# Patient Record
Sex: MALE | Race: BLACK OR AFRICAN AMERICAN | NOT HISPANIC OR LATINO | Employment: FULL TIME | URBAN - METROPOLITAN AREA
[De-identification: names, ages, dates, MRNs, and addresses within clinical notes are randomized per-mention and may not be internally consistent; named-entity substitution may affect disease eponyms.]

---

## 2017-12-23 ENCOUNTER — HOSPITAL ENCOUNTER (EMERGENCY)
Facility: HOSPITAL | Age: 37
Discharge: HOME | End: 2017-12-23
Attending: EMERGENCY MEDICINE
Payer: COMMERCIAL

## 2017-12-23 VITALS
RESPIRATION RATE: 18 BRPM | TEMPERATURE: 98 F | DIASTOLIC BLOOD PRESSURE: 73 MMHG | OXYGEN SATURATION: 96 % | SYSTOLIC BLOOD PRESSURE: 122 MMHG | HEIGHT: 68 IN | BODY MASS INDEX: 25.01 KG/M2 | HEART RATE: 93 BPM | WEIGHT: 165 LBS

## 2017-12-23 DIAGNOSIS — R05.9 COUGH: Primary | ICD-10-CM

## 2017-12-23 DIAGNOSIS — R09.81 NASAL CONGESTION: ICD-10-CM

## 2017-12-23 PROCEDURE — 94640 AIRWAY INHALATION TREATMENT: CPT

## 2017-12-23 PROCEDURE — 25000242 PHARM REV CODE 250 ALT 637 W/ HCPCS: Performed by: PHYSICIAN ASSISTANT

## 2017-12-23 PROCEDURE — 99284 EMERGENCY DEPT VISIT MOD MDM: CPT | Mod: 25

## 2017-12-23 PROCEDURE — 94761 N-INVAS EAR/PLS OXIMETRY MLT: CPT

## 2017-12-23 RX ORDER — ALBUTEROL SULFATE 90 UG/1
1-2 AEROSOL, METERED RESPIRATORY (INHALATION) EVERY 6 HOURS PRN
Qty: 1 INHALER | Refills: 0 | Status: SHIPPED | OUTPATIENT
Start: 2017-12-23

## 2017-12-23 RX ORDER — BENZONATATE 100 MG/1
100 CAPSULE ORAL 3 TIMES DAILY PRN
Qty: 20 CAPSULE | Refills: 0 | Status: SHIPPED | OUTPATIENT
Start: 2017-12-23 | End: 2018-01-02

## 2017-12-23 RX ORDER — IPRATROPIUM BROMIDE AND ALBUTEROL SULFATE 2.5; .5 MG/3ML; MG/3ML
3 SOLUTION RESPIRATORY (INHALATION)
Status: COMPLETED | OUTPATIENT
Start: 2017-12-23 | End: 2017-12-23

## 2017-12-23 RX ORDER — OXYMETAZOLINE HCL 0.05 %
1 SPRAY, NON-AEROSOL (ML) NASAL 2 TIMES DAILY
Qty: 15 ML | Refills: 0 | Status: SHIPPED | OUTPATIENT
Start: 2017-12-23 | End: 2017-12-26

## 2017-12-23 RX ADMIN — IPRATROPIUM BROMIDE AND ALBUTEROL SULFATE 3 ML: .5; 3 SOLUTION RESPIRATORY (INHALATION) at 05:12

## 2017-12-23 NOTE — ED PROVIDER NOTES
Encounter Date: 12/23/2017    SCRIBE #1 NOTE: I, Rajeev Quinn, am scribing for, and in the presence of,  Trey Vail PA-C. I have scribed the following portions of the note - Other sections scribed: HPI and ROS.       History     Chief Complaint   Patient presents with    Cough     dry cough x 3 weeks.  + vomiting and headache.  pains to chest, back, and abd  with coughing.     CC: Cough     HPI: This 37 y.o M with no pertinent PMHx presents to the ED c/o constant cough and rhinorrhea x3 weeks. The pt also reports 2 episodes of post-tussive emesis. The pt denies nausea, abdominal pain, SOB and recent antibiotics or steroid use. The pt attempted tx with Claritin with no relief.       The history is provided by the patient. No  was used.     Review of patient's allergies indicates:  No Known Allergies  History reviewed. No pertinent past medical history.  No past surgical history on file.  History reviewed. No pertinent family history.  Social History   Substance Use Topics    Smoking status: Never Smoker    Smokeless tobacco: Never Used    Alcohol use Yes      Comment: occ     Review of Systems   Constitutional: Negative for chills and fever.   HENT: Positive for rhinorrhea. Negative for sore throat.    Eyes: Negative for redness.   Respiratory: Positive for cough.    Cardiovascular: Negative for chest pain.   Gastrointestinal: Positive for vomiting (post-tussive). Negative for abdominal pain, diarrhea and nausea.   Genitourinary: Negative for dysuria.   Musculoskeletal: Negative for back pain.   Skin: Negative for color change.   Neurological: Negative for syncope and weakness.   Psychiatric/Behavioral: The patient is not nervous/anxious.        Physical Exam     Initial Vitals [12/23/17 1527]   BP Pulse Resp Temp SpO2   133/80 91 16 96.3 °F (35.7 °C) 99 %      MAP       97.67         Physical Exam    Nursing note and vitals reviewed.  Constitutional: He appears well-developed and  well-nourished. He is not diaphoretic. No distress.   HENT:   Head: Normocephalic and atraumatic.   Right Ear: Tympanic membrane, external ear and ear canal normal. No mastoid tenderness.   Left Ear: Tympanic membrane, external ear and ear canal normal. No mastoid tenderness.   Nose: Nose normal. No rhinorrhea. Right sinus exhibits no maxillary sinus tenderness and no frontal sinus tenderness. Left sinus exhibits no maxillary sinus tenderness and no frontal sinus tenderness.   Mouth/Throat: Uvula is midline. No trismus in the jaw. No oropharyngeal exudate, posterior oropharyngeal edema, posterior oropharyngeal erythema or tonsillar abscesses.   Eyes: Conjunctivae and EOM are normal. Right eye exhibits no discharge. Left eye exhibits no discharge.   Neck: Normal range of motion. No tracheal deviation present. No JVD present.   Cardiovascular: Normal rate, regular rhythm and normal heart sounds. Exam reveals no friction rub.    No murmur heard.  Pulmonary/Chest: Breath sounds normal. No stridor. No respiratory distress. He has no decreased breath sounds. He has no wheezes. He has no rhonchi. He has no rales. He exhibits no tenderness.   Abdominal: Soft. He exhibits no distension. There is no tenderness. There is no rigidity, no rebound, no guarding, no CVA tenderness, no tenderness at McBurney's point and negative Hall's sign.   Musculoskeletal: Normal range of motion.   Neurological: He is alert and oriented to person, place, and time. Coordination and gait normal. GCS eye subscore is 4. GCS verbal subscore is 5. GCS motor subscore is 6.   Skin: Skin is warm and dry. No rash and no abscess noted. No erythema. No pallor.         ED Course   Procedures  Labs Reviewed - No data to display          Medical Decision Making:   History:   Old Medical Records: I decided to obtain old medical records.      This is an urgent evaluation of a 37 y.o. male with no significant comorbidities presenting to the ED for cough. Denies  fever. Vitals WNL, afebrile. Patient is non-toxic appearing and in no acute distress. Spectrum of symptoms most consistent with viral URI. No PNA. No wheezing or respiratory distress to suggest acute asthma exacerbation. 0/4 Centor criteria in the presence of typical viral URI symptoms makes acute bacterial pharyngitis/tonsilitis unlikely. No sinus TTP or purulent rhinorrhea to suggest acute bacterial rhinosinusitis at this time. No evidence of AOM, mastoiditis, PTA, Janes's, epiglottitis, and meningitis.       Duoneb in ED improves cough. Discharged home with supportive care. I discussed the use of OTC medications for symptom control. I advised patient to maintain adequate hydration and advance diet as tolerated to maintain adequate nutrition. Work note issued at patient's request.    I discussed with the patient the diagnosis, treatment plan, indications for return to the emergency department, and for expected follow-up. The patient verbalized an understanding. The patient is asked if there are any questions or concerns. We discuss the case, until all issues are addressed to the patients satisfaction. Patient understands and is agreeable to the plan.     I discussed this case with Dr. Molina who is in agreement with my assessment and plan.           Scribe Attestation:   Scribe #1: I performed the above scribed service and the documentation accurately describes the services I performed. I attest to the accuracy of the note.    Attending Attestation:           Physician Attestation for Scribe:  Physician Attestation Statement for Scribe #1: I, Trey Vail PA-C, reviewed documentation, as scribed by Rajeev Quinn in my presence, and it is both accurate and complete.                 ED Course      Clinical Impression:   The primary encounter diagnosis was Cough. A diagnosis of Nasal congestion was also pertinent to this visit.    Disposition:   Disposition: Discharged  Condition: Stable                         Trey Vail PA-C  12/23/17 2046

## 2017-12-23 NOTE — ED TRIAGE NOTES
C/o cough,  Back pain, upper abd pain x 3 weeks.  C/o nasal congestion, runny nose.   claritan taken today.

## 2025-06-12 ENCOUNTER — HOSPITAL ENCOUNTER (EMERGENCY)
Facility: HOSPITAL | Age: 45
Discharge: HOME | End: 2025-06-12
Attending: EMERGENCY MEDICINE
Payer: COMMERCIAL

## 2025-06-12 VITALS
RESPIRATION RATE: 18 BRPM | WEIGHT: 185 LBS | HEART RATE: 87 BPM | OXYGEN SATURATION: 99 % | BODY MASS INDEX: 28.04 KG/M2 | TEMPERATURE: 99 F | DIASTOLIC BLOOD PRESSURE: 90 MMHG | HEIGHT: 68 IN | SYSTOLIC BLOOD PRESSURE: 144 MMHG

## 2025-06-12 DIAGNOSIS — R05.9 COUGH: ICD-10-CM

## 2025-06-12 DIAGNOSIS — R07.9 CHEST PAIN: ICD-10-CM

## 2025-06-12 DIAGNOSIS — R06.2 WHEEZING: Primary | ICD-10-CM

## 2025-06-12 LAB
ALBUMIN SERPL-MCNC: 4.1 G/DL (ref 3.3–5.5)
ALP SERPL-CCNC: 47 U/L (ref 42–141)
BILIRUB SERPL-MCNC: 0.7 MG/DL (ref 0.2–1.6)
BUN SERPL-MCNC: 7 MG/DL (ref 7–22)
CALCIUM SERPL-MCNC: 9.4 MG/DL (ref 8–10.3)
CHLORIDE SERPL-SCNC: 103 MMOL/L (ref 98–108)
CREAT SERPL-MCNC: 1.2 MG/DL (ref 0.6–1.2)
CTP QC/QA: YES
GLUCOSE SERPL-MCNC: 95 MG/DL (ref 73–118)
HCT, POC: NORMAL
HGB, POC: NORMAL (ref 14–18)
INFLUENZA A ANTIGEN, POC: NEGATIVE
INFLUENZA B ANTIGEN, POC: NEGATIVE
MCH, POC: NORMAL
MCHC, POC: NORMAL
MCV, POC: NORMAL
MPV, POC: NORMAL
POC ALT (SGPT): 17 U/L (ref 10–47)
POC AST (SGOT): 21 U/L (ref 11–38)
POC B-TYPE NATRIURETIC PEPTIDE: <5 PG/ML (ref 0–100)
POC CARDIAC TROPONIN I: 0 NG/ML (ref 0–0.08)
POC PLATELET COUNT: NORMAL
POC TCO2: 28 MMOL/L (ref 18–33)
POTASSIUM BLD-SCNC: 4.3 MMOL/L (ref 3.6–5.1)
PROTEIN, POC: 8 G/DL (ref 6.4–8.1)
RBC, POC: NORMAL
RDW, POC: NORMAL
SAMPLE: NORMAL
SARS-COV-2 RDRP RESP QL NAA+PROBE: NEGATIVE
SODIUM BLD-SCNC: 137 MMOL/L (ref 128–145)
WBC, POC: NORMAL

## 2025-06-12 PROCEDURE — 25000242 PHARM REV CODE 250 ALT 637 W/ HCPCS: Mod: ER | Performed by: EMERGENCY MEDICINE

## 2025-06-12 PROCEDURE — 63600175 PHARM REV CODE 636 W HCPCS: Mod: ER | Performed by: EMERGENCY MEDICINE

## 2025-06-12 PROCEDURE — 25000242 PHARM REV CODE 250 ALT 637 W/ HCPCS: Mod: ER | Performed by: NURSE PRACTITIONER

## 2025-06-12 PROCEDURE — 87635 SARS-COV-2 COVID-19 AMP PRB: CPT | Mod: ER | Performed by: EMERGENCY MEDICINE

## 2025-06-12 PROCEDURE — 94640 AIRWAY INHALATION TREATMENT: CPT | Mod: ER

## 2025-06-12 PROCEDURE — 99285 EMERGENCY DEPT VISIT HI MDM: CPT | Mod: 25,ER

## 2025-06-12 PROCEDURE — 93010 ELECTROCARDIOGRAM REPORT: CPT | Mod: ,,, | Performed by: INTERNAL MEDICINE

## 2025-06-12 PROCEDURE — 87804 INFLUENZA ASSAY W/OPTIC: CPT | Mod: ER

## 2025-06-12 PROCEDURE — 93005 ELECTROCARDIOGRAM TRACING: CPT | Mod: ER

## 2025-06-12 RX ORDER — AZITHROMYCIN 250 MG/1
TABLET, FILM COATED ORAL
Qty: 6 TABLET | Refills: 0 | Status: SHIPPED | OUTPATIENT
Start: 2025-06-12 | End: 2025-06-17

## 2025-06-12 RX ORDER — PREDNISONE 20 MG/1
40 TABLET ORAL DAILY
Qty: 8 TABLET | Refills: 0 | Status: SHIPPED | OUTPATIENT
Start: 2025-06-12 | End: 2025-06-16

## 2025-06-12 RX ORDER — AZITHROMYCIN 250 MG/1
TABLET, FILM COATED ORAL
Qty: 6 TABLET | Refills: 0 | Status: SHIPPED | OUTPATIENT
Start: 2025-06-12 | End: 2025-06-12

## 2025-06-12 RX ORDER — PREDNISONE 20 MG/1
40 TABLET ORAL DAILY
Qty: 8 TABLET | Refills: 0 | Status: SHIPPED | OUTPATIENT
Start: 2025-06-12 | End: 2025-06-12

## 2025-06-12 RX ORDER — IPRATROPIUM BROMIDE AND ALBUTEROL SULFATE 2.5; .5 MG/3ML; MG/3ML
3 SOLUTION RESPIRATORY (INHALATION)
Status: COMPLETED | OUTPATIENT
Start: 2025-06-12 | End: 2025-06-12

## 2025-06-12 RX ORDER — PREDNISONE 20 MG/1
40 TABLET ORAL
Status: COMPLETED | OUTPATIENT
Start: 2025-06-12 | End: 2025-06-12

## 2025-06-12 RX ORDER — ALBUTEROL SULFATE 90 UG/1
1-2 INHALANT RESPIRATORY (INHALATION) EVERY 6 HOURS PRN
Qty: 6.7 G | Refills: 0 | Status: SHIPPED | OUTPATIENT
Start: 2025-06-12 | End: 2025-06-12

## 2025-06-12 RX ORDER — ALBUTEROL SULFATE 90 UG/1
1-2 INHALANT RESPIRATORY (INHALATION) EVERY 6 HOURS PRN
Qty: 6.7 G | Refills: 0 | Status: SHIPPED | OUTPATIENT
Start: 2025-06-12 | End: 2025-07-12

## 2025-06-12 RX ADMIN — IPRATROPIUM BROMIDE AND ALBUTEROL SULFATE 3 ML: .5; 3 SOLUTION RESPIRATORY (INHALATION) at 04:06

## 2025-06-12 RX ADMIN — PREDNISONE 40 MG: 20 TABLET ORAL at 04:06

## 2025-06-12 NOTE — ED PROVIDER NOTES
"Encounter Date: 6/12/2025    SCRIBE #1 NOTE: I, Bri Pudry, am scribing for, and in the presence of,  Blayne Day MD. I have scribed the following portions of the note - Other sections scribed: HPI.       History     Chief Complaint   Patient presents with    Cough     Productive yellow cough x one month with increase sob and sore throat x 2 days      Peter Keyes is a 44 y.o. male, with no pertinent past medical history, who presents to the ED for evaluation of productive cough with yellow sputum x 2-3 months. Patient reports associated wheezing, "fluttering" inside his chest, right lower rib pain wrapping around to his right mid-back, fatigue, and sore throat x 2 days. 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 He denies ongoing dyspnea. He reports he had 1 episode of post-tussive emesis the other day but has not had further episodes of emesis since. He reports attempting treatment with cough syrup daily. No daily medications. He reports he may have had bronchitis in the past but denies history of asthma or COPD. He denies history of medical problems. No other exacerbating or alleviating factors. Patient denies fever, nausea, hemoptysis, leg swelling, or other associated symptoms. NKDA.     The history is provided by the patient. No  was used.     Review of patient's allergies indicates:  No Known Allergies  No past medical history on file.  No past surgical history on file.  No family history on file.  Social History[1]  Review of Systems   Respiratory:  Positive for cough and wheezing.        Physical Exam     Initial Vitals [06/12/25 1439]   BP Pulse Resp Temp SpO2   (!) 145/90 91 19 99.1 °F (37.3 °C) 99 %      MAP       --         Physical Exam    Nursing note and vitals reviewed.  Constitutional: He appears well-developed and well-nourished. He is not diaphoretic. No distress.   HENT:   Head: Normocephalic and " atraumatic.   Eyes: Conjunctivae and EOM are normal. Pupils are equal, round, and reactive to light. No scleral icterus.   Neck: Neck supple.   Normal range of motion.  Cardiovascular:  Normal rate, regular rhythm, normal heart sounds and intact distal pulses.     Exam reveals no gallop and no friction rub.       No murmur heard.  Pulmonary/Chest: No stridor. No respiratory distress. He has wheezes. He has no rhonchi. He has no rales.   Bilateral wheezing with prolonged end expiratory phase   Abdominal: Abdomen is soft. Bowel sounds are normal. He exhibits no distension. There is no abdominal tenderness. There is no rebound and no guarding.   Musculoskeletal:         General: No tenderness or edema. Normal range of motion.      Cervical back: Normal range of motion and neck supple.     Neurological: He is alert and oriented to person, place, and time. He has normal strength. No cranial nerve deficit.   Skin: Skin is warm and dry. No rash noted.   Psychiatric: He has a normal mood and affect. His behavior is normal.         ED Course   Procedures  Labs Reviewed   TROPONIN ISTAT       Result Value    POC Cardiac Troponin I 0.00      Sample unknown     SARS-COV-2 RDRP GENE    POC Rapid COVID Negative       Acceptable Yes     POCT CBC    Hematocrit        Hemoglobin        RBC        WBC        MCV        MCH, POC        MCHC        RDW-CV        Platelet Count, POC        MPV       POCT INFLUENZA A/B MOLECULAR   POCT RAPID INFLUENZA A/B    Influenza B Ag negative      Inflenza A Ag negative     POCT CMP   POCT TROPONIN   POCT B-TYPE NATRIURETIC PEPTIDE (BNP)   POCT CMP    Albumin, POC 4.1      Alkaline Phosphatase, POC 47      ALT (SGPT), POC 17      AST (SGOT), POC 21      POC BUN 7      Calcium, POC 9.4      POC Chloride 103      POC Creatinine 1.2      POC Glucose 95      POC Potassium 4.3      POC Sodium 137      Bilirubin, POC 0.7      POC TCO2 28      Protein, POC 8.0     POCT B-TYPE NATRIURETIC  PEPTIDE (BNP)    POC B-Type Natriuretic Peptide <5.0       EKG Readings: (Independently Interpreted)   Initial Reading: No STEMI. Rhythm: Normal Sinus Rhythm. Heart Rate: 83.   No ST segment elevation or depression concerning for acute ischemia.          Imaging Results              X-Ray Chest PA And Lateral (Final result)  Result time 06/12/25 17:16:00      Final result by Merle Castle MD (06/12/25 17:16:00)                   Impression:      No acute cardiopulmonary process identified.      Electronically signed by: eMrle Castle MD  Date:    06/12/2025  Time:    17:16               Narrative:    EXAMINATION:  XR CHEST PA AND LATERAL    CLINICAL HISTORY:  Cough, unspecified    TECHNIQUE:  PA and lateral views of the chest were performed.    COMPARISON:  December 2017.    FINDINGS:  Cardiac silhouette is normal in size.  Lungs are symmetrically expanded.  No evidence of focal consolidative process, pneumothorax, or significant pleural effusion.  No acute osseous abnormality identified.                                       Medications   albuterol-ipratropium 2.5 mg-0.5 mg/3 mL nebulizer solution 3 mL (3 mLs Nebulization Given 6/12/25 1621)   predniSONE tablet 40 mg (40 mg Oral Given 6/12/25 1649)   albuterol-ipratropium 2.5 mg-0.5 mg/3 mL nebulizer solution 3 mL (3 mLs Nebulization Given 6/12/25 1633)     Medical Decision Making  Amount and/or Complexity of Data Reviewed  Labs: ordered. Decision-making details documented in ED Course.  Radiology: ordered. Decision-making details documented in ED Course.    Risk  Prescription drug management.            Scribe Attestation:   Scribe #1: I performed the above scribed service and the documentation accurately describes the services I performed. I attest to the accuracy of the note.                   Medical Decision Making:   Initial Assessment:   44-year-old male presenting with cough and wheeze.  Wheezing noted on exam.  No respiratory distress.  Satting 100%,  breathing comfortably.  Lung exam is symmetric.  Chest x-ray negative for pneumonia or consolidation.  Lab workup without evidence of CHF.  Suspect reactive airway disease.  Symptoms ongoing 2 months.  Notes productive yellowish or greenish cough.  Reports feeling better with steroids and nebs.  Declining additional neb.  Will discharge with steroids, inhaler, antibiotics.  Believe he is safe for discharge home.  Discussed return precautions.         I, Blayne Day, personally performed the services described in this documentation. All medical record entries made by the scribe were at my direction and in my presence. I have reviewed the chart and agree that the record reflects my personal performance and is accurate and complete.      DISCLAIMER: This note was prepared with [employer] voice recognition transcription software. Garbled syntax, mangled pronouns, and other bizarre constructions may be attributed to that software system.      Clinical Impression:  Final diagnoses:  [R05.9] Cough  [R07.9] Chest pain  [R06.2] Wheezing (Primary)          ED Disposition Condition    Discharge Stable          ED Prescriptions       Medication Sig Dispense Start Date End Date Auth. Provider    azithromycin (Z-ABRAHAM) 250 MG tablet  (Status: Discontinued) Take 2 tablets by mouth on day 1; Take 1 tablet by mouth on days 2-5 6 tablet 6/12/2025 6/12/2025 Blayne Day MD    albuterol (PROVENTIL/VENTOLIN HFA) 90 mcg/actuation inhaler  (Status: Discontinued) Inhale 1-2 puffs into the lungs every 6 (six) hours as needed. Rescue 6.7 g 6/12/2025 6/12/2025 Blayne Day MD    predniSONE (DELTASONE) 20 MG tablet  (Status: Discontinued) Take 2 tablets (40 mg total) by mouth once daily. for 4 days 8 tablet 6/12/2025 6/12/2025 Blayne Day MD    albuterol (PROVENTIL/VENTOLIN HFA) 90 mcg/actuation inhaler Inhale 1-2 puffs into the lungs every 6 (six) hours as needed. Rescue 6.7 g 6/12/2025 7/12/2025 Blayne Day  MD GAIL    azithromycin (Z-ABRAHAM) 250 MG tablet Take 2 tablets by mouth on day 1; Take 1 tablet by mouth on days 2-5 6 tablet 6/12/2025 6/17/2025 Blayne Day MD    predniSONE (DELTASONE) 20 MG tablet Take 2 tablets (40 mg total) by mouth once daily. for 4 days 8 tablet 6/12/2025 6/16/2025 Blayne Day MD          Follow-up Information       Follow up With Specialties Details Why Contact [facility] ED Emergency Medicine  As needed, If symptoms worsen [address] [facility] [id number] [phone]                   [1]   Social History  Tobacco Use    Smoking status: Never    Smokeless tobacco: Never   Substance Use Topics    Alcohol use: Yes     Comment: Blayne Adam MD  06/12/25 [id number]

## 2025-06-12 NOTE — DISCHARGE INSTRUCTIONS
You were seen in the emergency department for a cough and wheezing. We gave you steroids and a breathing treatment and you felt better.  We have given you a prescription for steroids and antibiotics.  Please take it daily for the next 4 days.  Please follow-up with your primary care provider.  Please return immediately for any new or worsening wheezing, difficulty breathing, dizziness, or you become concerned in any way.

## 2025-06-13 LAB
OHS QRS DURATION: 94 MS
OHS QTC CALCULATION: 441 MS